# Patient Record
(demographics unavailable — no encounter records)

---

## 2024-10-17 NOTE — PROCEDURE
[FreeTextEntry1] : US guided thryoid FNA [FreeTextEntry2] : Isthmus 1.3 cm TR5 [FreeTextEntry3] : Patient for thyroid biopsy. Risks benefits and alternatives discussed including bleeding, infection, swelling and need for repeat biopsy. Questions answered. Consent obtained, timeout taken.  Patient supine. No anesthetic used.  Nodule localized with US guidance Sterile technique with Betadine skin prep. 22-gauge needle under ultrasound guidance with a single pass. Slides prepared sent to cytology.  Post procedure: Hemostasis obtained, patient tolerated well, no complications. Post procedure instructions given.

## 2024-10-17 NOTE — PHYSICAL EXAM
[Normal] : no neck adenopathy [de-identified] : no cervical or supraclavicular adenopathy, trachea midline, thyroid low lying, without enlargement  with 3 cm right palpable mass [de-identified] : Skin:  normal appearance.  no rash, nodules, vesicles, or erythema,\par  Musculoskeletal:  full range of motion and no deformities appreciated\par  Neurological:  grossly intact\par  Psychiatric:  oriented to person, place and time with appropriate affect

## 2024-10-17 NOTE — ASSESSMENT
[FreeTextEntry1] : Patient with mng and history of hyperthyroidism. prior Ultrasound-guided fine-needle of dominant right nodule benign, suspicious isthmus nodule on follow up ultrasound, FNA performed.  patient will contact office to review results, if benign, repeat US 3/2024, RTO 6 months    I have answered their questions to the best of my ability.

## 2024-10-17 NOTE — HISTORY OF PRESENT ILLNESS
[de-identified] : Patient referred by Dr. Thrasher for evaluation of multinodular goiter with hyperthyroidism. On recent physical examination, patient was noted to have a thyroid nodule. Thyroid ultrasound October 2020: Right lobe 4.7 x 3 x 2.7 CM with large complex mass measuring 3.8 x 2.8 x 3 CM. Left lobe 4.3 x 1.2 x 1.6 CM. No discrete nodules. Right isthmus 1.4 x 0.8 x 1.2 CM nodule. No enlarged lymph nodes. Patient denies dysphagia, change in voice, radiation exposure, palpitations, jitteriness or change in weight.  Recent blood work with TSH 0.01 and total T4 13.3.  denies symptoms of hyperthyroidism. FNA right thyroid nodule 10/2020 benign. f/u US 6/2022   no suspicious findings. or appreciable change. 8/2024 TSH <0.01, T3, T4 normal.   denies symptoms of hyperthyroidism. repeat US 8/2024 with stable rightnodule with isthmus nodule 1.3 TR5. denies recent illness.   I have reviewed all old and new data and available images.

## 2024-11-05 NOTE — PHYSICAL EXAM
[Normal Sclera/Conjunctiva] : normal sclera/conjunctiva [Normal Outer Ear/Nose] : the outer ears and nose were normal in appearance [No JVD] : no jugular venous distention [No Respiratory Distress] : no respiratory distress  [No Accessory Muscle Use] : no accessory muscle use [Clear to Auscultation] : lungs were clear to auscultation bilaterally [Normal Rate] : normal rate  [Regular Rhythm] : with a regular rhythm [No Edema] : there was no peripheral edema [Soft] : abdomen soft [Non Tender] : non-tender [Non-distended] : non-distended [Coordination Grossly Intact] : coordination grossly intact [No Focal Deficits] : no focal deficits [Normal] : affect was normal and insight and judgment were intact

## 2024-11-05 NOTE — HISTORY OF PRESENT ILLNESS
[No Pertinent Cardiac History] : no history of aortic stenosis, atrial fibrillation, coronary artery disease, recent myocardial infarction, or implantable device/pacemaker [No Adverse Anesthesia Reaction] : no adverse anesthesia reaction in self or family member [(Patient denies any chest pain, claudication, dyspnea on exertion, orthopnea, palpitations or syncope)] : Patient denies any chest pain, claudication, dyspnea on exertion, orthopnea, palpitations or syncope [Moderate (4-6 METs)] : Moderate (4-6 METs) [NSAIDs: _____] : NSAIDs: [unfilled] [Aortic Stenosis] : no aortic stenosis [Atrial Fibrillation] : no atrial fibrillation [Coronary Artery Disease] : no coronary artery disease [Recent Myocardial Infarction] : no recent myocardial infarction [Implantable Device/Pacemaker] : no implantable device/pacemaker [Asthma] : no asthma [COPD] : no COPD [Sleep Apnea] : no sleep apnea [Smoker] : not a smoker [Family Member] : no family member with adverse anesthesia reaction/sudden death [Self] : no previous adverse anesthesia reaction [Chronic Anticoagulation] : no chronic anticoagulation [Chronic Kidney Disease] : no chronic kidney disease [Diabetes] : no diabetes [FreeTextEntry1] : right thyroid lobectomy with isthmus, paratracheal node dissection aad closure with frozen section [FreeTextEntry2] : 11/11/2024. [FreeTextEntry3] :  Dr Small [FreeTextEntry4] : 81M w/ PMH of new diagnosis of Papillary Thyroid Carcinoma, hyperthyroidism, MNG, HTN, HLD, Anxiety/Depression is coming in for MCA for right thyroid lobectomy with isthmus, paratracheal node dissection and closure with frozen section with Dr Small on 11/11/2024.  Feels well today, no acute complaints.

## 2024-11-05 NOTE — ASSESSMENT
[Patient Optimized for Surgery] : Patient optimized for surgery [No Further Testing Recommended] : no further testing recommended [Modify anti-platelet treatment prior to procedure] : Modify anti-platelet treatment prior to procedure [As per surgery] : as per surgery [FreeTextEntry4] : 81M w/ PMH of new diagnosis of Papillary Thyroid Carcinoma, hyperthyroidism, MNG, HTN, HLD, Anxiety/Depression is coming in for MCA for right thyroid lobectomy with isthmus, paratracheal node dissection and closure with frozen section with Dr Small on 11/11/2024.  #Pre-op exam -Reviewed CBC, CMP, EKG from PST, all stable -Patient currently asymptomatic, METS>4  Patient is medically optimized for this intermediate risk surgery.  [FreeTextEntry6] : As per surgical team, patient already holding Meloxicam

## 2024-11-12 NOTE — PHYSICAL EXAM
[de-identified] : dressing intact, CELESTINE serous. no cervical or supraclavicular adenopathy, trachea midline, thyroid low lying, without enlargement  or palpable mass [Normal] : no neck adenopathy [de-identified] : Skin:  normal appearance.  no rash, nodules, vesicles, or erythema,\par  Musculoskeletal:  full range of motion and no deformities appreciated\par  Neurological:  grossly intact\par  Psychiatric:  oriented to person, place and time with appropriate affect

## 2024-11-12 NOTE — HISTORY OF PRESENT ILLNESS
[de-identified] : Patient referred by Dr. Thrasher for evaluation of multinodular goiter. On recent physical examination, patient was noted to have a thyroid nodule. Thyroid ultrasound October 2020: Right lobe 4.7 x 3 x 2.7 CM with large complex mass measuring 3.8 x 2.8 x 3 CM. Left lobe 4.3 x 1.2 x 1.6 CM. No discrete nodules. Right isthmus 1.4 x 0.8 x 1.2 CM nodule. No enlarged lymph nodes. Patient denies dysphagia, change in voice, radiation exposure, palpitations, jitteriness or change in weight.  Recent blood work with TSH 0.01 and total T4 13.3.  denies symptoms of hyperthyroidism. FNA right thyroid nodule 10/2020 benign. f/u US 6/2022   no suspicious findings. or appreciable change. 8/2024 TSH <0.01, T3, T4 normal.   denies symptoms of hyperthyroidism. repeat US 8/2024 with stable right nodule with isthmus nodule 1.3 TR5. 11/11/24 right thyroid [id number] with isthmusectomy and paratracheal dissection.  CELESTINE serous.  Patient denies dysphagia, hoarseness, pain or parathesias. I have reviewed all old and new data and available images.

## 2024-11-12 NOTE — ASSESSMENT
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  CELESTINE removed.  RTO 1 week.   I have answered their questions to the best of my ability.

## 2024-11-19 NOTE — HISTORY OF PRESENT ILLNESS
[de-identified] : Patient referred by Dr. Thrasher for evaluation of multinodular goiter. On recent physical examination, patient was noted to have a thyroid nodule. Thyroid ultrasound October 2020: Right lobe 4.7 x 3 x 2.7 CM with large complex mass measuring 3.8 x 2.8 x 3 CM. Left lobe 4.3 x 1.2 x 1.6 CM. No discrete nodules. Right isthmus 1.4 x 0.8 x 1.2 CM nodule. No enlarged lymph nodes. Patient denies dysphagia, change in voice, radiation exposure, palpitations, jitteriness or change in weight.  Recent blood work with TSH 0.01 and total T4 13.3.  denies symptoms of hyperthyroidism. FNA right thyroid nodule 10/2020 benign. f/u US 6/2022   no suspicious findings. or appreciable change. 8/2024 TSH <0.01, T3, T4 normal.   denies symptoms of hyperthyroidism. repeat US 8/2024 with stable right nodule with isthmus nodule 1.3 TR5. 11/11/24 right thyroid lobectomy with isthmusectomy and paratracheal dissection. path pending.  Patient denies dysphagia, hoarseness, pain or parathesias. I have reviewed all old and new data and available images.

## 2024-11-19 NOTE — ASSESSMENT
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  Doing well.  Scar minimizing discussed.  Will contact patient with pathology available.  RTO 6 weeks..   I have answered their questions to the best of my ability.

## 2024-11-19 NOTE — PHYSICAL EXAM
[de-identified] : Incision healing with min swelling, scar min discussed.. no cervical or supraclavicular adenopathy, trachea midline, thyroid low lying, without enlargement  or palpable mass [Normal] : no neck adenopathy [de-identified] : Skin:  normal appearance.  no rash, nodules, vesicles, or erythema,\par  Musculoskeletal:  full range of motion and no deformities appreciated\par  Neurological:  grossly intact\par  Psychiatric:  oriented to person, place and time with appropriate affect

## 2024-12-16 NOTE — PHYSICAL EXAM
[Normal] : no acute distress, well nourished, well developed and well-appearing [No Respiratory Distress] : no respiratory distress  [No Accessory Muscle Use] : no accessory muscle use [No Focal Deficits] : no focal deficits [Speech Grossly Normal] : speech grossly normal [Normal Affect] : the affect was normal [Alert and Oriented x3] : oriented to person, place, and time [Normal Mood] : the mood was normal [de-identified] : 2 minute word recall fine

## 2024-12-16 NOTE — ASSESSMENT
[FreeTextEntry1] : 82M w/ PMH of new diagnosis of Papillary Thyroid Carcinoma, hyperthyroidism, MNG, HTN, HLD, Anxiety/Depression is coming in for memory loss x 1-2 months.  #memory loss -Last TSH 0.01 -Repeat TSH, Free T3, Free T4, CMP, HIV, Syphilis screen -Neuro Consult for further eval  RTC depending on bw results

## 2024-12-16 NOTE — HISTORY OF PRESENT ILLNESS
[FreeTextEntry1] : 82M w/ PMH of new diagnosis of Papillary Thyroid Carcinoma, hyperthyroidism, MNG, HTN, HLD, Anxiety/Depression is coming in for memory loss x 1-2 months. [de-identified] : 82M w/ PMH of new diagnosis of Papillary Thyroid Carcinoma, hyperthyroidism, MNG, HTN, HLD, Anxiety/Depression is coming in for memory loss x 1-2 months.  Since 1-2 months ago, patient has had memory difficulties. Has had trouble with directions.   No issues since 11/11/24 right thyroid lobectomy with isthmusectomy and paratracheal dissection  2 minute 3-word recall fine.

## 2024-12-16 NOTE — HISTORY OF PRESENT ILLNESS
[FreeTextEntry1] : 82M w/ PMH of new diagnosis of Papillary Thyroid Carcinoma, hyperthyroidism, MNG, HTN, HLD, Anxiety/Depression is coming in for memory loss x 1-2 months. [de-identified] : 82M w/ PMH of new diagnosis of Papillary Thyroid Carcinoma, hyperthyroidism, MNG, HTN, HLD, Anxiety/Depression is coming in for memory loss x 1-2 months.  Since 1-2 months ago, patient has had memory difficulties. Has had trouble with directions.   No issues since 11/11/24 right thyroid lobectomy with isthmusectomy and paratracheal dissection  2 minute 3-word recall fine.

## 2024-12-16 NOTE — PHYSICAL EXAM
[Normal] : no acute distress, well nourished, well developed and well-appearing [No Respiratory Distress] : no respiratory distress  [No Accessory Muscle Use] : no accessory muscle use [No Focal Deficits] : no focal deficits [Speech Grossly Normal] : speech grossly normal [Normal Affect] : the affect was normal [Alert and Oriented x3] : oriented to person, place, and time [Normal Mood] : the mood was normal [de-identified] : 2 minute word recall fine

## 2025-01-02 NOTE — PHYSICAL EXAM
[Normal] : no neck adenopathy [de-identified] : Incision healing well, scar min discussed.. no cervical or supraclavicular adenopathy, trachea midline, thyroid low lying, without enlargement  or palpable mass [de-identified] : Skin:  normal appearance.  no rash, nodules, vesicles, or erythema,\par  Musculoskeletal:  full range of motion and no deformities appreciated\par  Neurological:  grossly intact\par  Psychiatric:  oriented to person, place and time with appropriate affect

## 2025-01-02 NOTE — HISTORY OF PRESENT ILLNESS
[de-identified] : Patient referred by Dr. Thrasher for evaluation of multinodular goiter. On recent physical examination, patient was noted to have a thyroid nodule. Thyroid ultrasound October 2020: Right lobe 4.7 x 3 x 2.7 CM with large complex mass measuring 3.8 x 2.8 x 3 CM. Left lobe 4.3 x 1.2 x 1.6 CM. No discrete nodules. Right isthmus 1.4 x 0.8 x 1.2 CM nodule. No enlarged lymph nodes. Patient denies dysphagia, change in voice, radiation exposure, palpitations, jitteriness or change in weight.  Recent blood work with TSH 0.01 and total T4 13.3.  denies symptoms of hyperthyroidism. FNA right thyroid nodule 10/2020 benign. f/u US 6/2022   no suspicious findings. or appreciable change. 8/2024 TSH <0.01, T3, T4 normal.   denies symptoms of hyperthyroidism. repeat US 8/2024 with stable right nodule with isthmus nodule 1.3 TR5. 11/11/24 right thyroid lobectomy with isthmusectomy and paratracheal dissection. path 1.1 cm papillary thyroid cancer with negative LN..  Patient denies dysphagia, hoarseness, pain or parathesias.  Patient was started on levothyroxine 25 mcg several weeks ago.  Blood work not available.  I have reviewed all old and new data and available images.

## 2025-01-02 NOTE — ASSESSMENT
[FreeTextEntry1] : Patient with papillary thyroid cancer, status post right thyroid lobectomy and isthmusectomy.  Doing well.  Scar minimizing discussed.  Blood work sent.  Patient will contact my office to review results and determine need for thyroid hormone replacement.  Neck ultrasound for 2025, RTO 6 months . I reviewed the expected course of illness and the intent of current treatment with the patient. I have answered her questions.

## 2025-05-08 NOTE — HISTORY OF PRESENT ILLNESS
[de-identified] : Patient referred by Dr. Thrasher for evaluation of multinodular goiter. On recent physical examination, patient was noted to have a thyroid nodule. Thyroid ultrasound October 2020: Right lobe 4.7 x 3 x 2.7 CM with large complex mass measuring 3.8 x 2.8 x 3 CM. Left lobe 4.3 x 1.2 x 1.6 CM. No discrete nodules. Right isthmus 1.4 x 0.8 x 1.2 CM nodule. No enlarged lymph nodes. Patient denies dysphagia, change in voice, radiation exposure, palpitations, jitteriness or change in weight.  Recent blood work with TSH 0.01 and total T4 13.3.  denies symptoms of hyperthyroidism. FNA right thyroid nodule 10/2020 benign. f/u US 6/2022   no suspicious findings. or appreciable change. 8/2024 TSH <0.01, T3, T4 normal.   denies symptoms of hyperthyroidism. repeat US 8/2024 with stable right nodule with isthmus nodule 1.3 TR5. 11/11/24 right thyroid lobectomy with isthmusectomy and paratracheal dissection. path 1.1 cm papillary thyroid cancer with negative LN..  Patient denies dysphagia, hoarseness, pain or parathesias.  Patient on levothyroxine 75 mcg patient denies fatigue or palpitations..  Neck ultrasound March 2025: No tissue in right thyroid bed.  No left nodules.  No lymph nodes or evidence of recurrence.  I have reviewed all old and new data and available images.

## 2025-05-08 NOTE — PHYSICAL EXAM
[de-identified] : Incision well-healed. no cervical or supraclavicular adenopathy, trachea midline, thyroid low lying, without enlargement  or palpable mass [Normal] : no neck adenopathy [de-identified] : Skin:  normal appearance.  no rash, nodules, vesicles, or erythema,\par  Musculoskeletal:  full range of motion and no deformities appreciated\par  Neurological:  grossly intact\par  Psychiatric:  oriented to person, place and time with appropriate affect

## 2025-05-08 NOTE — ASSESSMENT
[FreeTextEntry1] : Patient with papillary thyroid cancer, status post right thyroid lobectomy and isthmusectomy.  Doing well.  No suspicious findings on physical examination today or recent imaging.  Blood work sent.  Patient will contact my office to review results and we will adjust his dose as needed.  RTO 6 months . I reviewed the expected course of illness and the intent of current treatment with the patient. I have answered her questions.